# Patient Record
Sex: FEMALE | Race: OTHER | NOT HISPANIC OR LATINO | URBAN - METROPOLITAN AREA
[De-identification: names, ages, dates, MRNs, and addresses within clinical notes are randomized per-mention and may not be internally consistent; named-entity substitution may affect disease eponyms.]

---

## 2022-02-07 ENCOUNTER — NEW PATIENT (OUTPATIENT)
Dept: URBAN - METROPOLITAN AREA CLINIC 4 | Facility: CLINIC | Age: 77
End: 2022-02-07

## 2022-02-07 DIAGNOSIS — H43.812: ICD-10-CM

## 2022-02-07 DIAGNOSIS — H25.89: ICD-10-CM

## 2022-02-07 PROCEDURE — 92004 COMPRE OPH EXAM NEW PT 1/>: CPT

## 2022-02-07 PROCEDURE — 92134 CPTRZ OPH DX IMG PST SGM RTA: CPT

## 2022-02-07 ASSESSMENT — TONOMETRY
OD_IOP_MMHG: 19
OS_IOP_MMHG: 21

## 2022-02-07 ASSESSMENT — VISUAL ACUITY
OD_SC: 20/70
OS_SC: 20/50+1
OU_SC: 20/50
OD_PH: 20/50

## 2022-02-07 NOTE — PATIENT DISCUSSION
Retinal tear and detachment warning symptoms reviewed and patient instructed to call immediately if increasing floaters, flashes, or decreasing peripheral vision. Discussed immediate referral to retina if symptoms increase.

## 2022-02-07 NOTE — PATIENT DISCUSSION
Recommended observation. Will complete RNFL OCT at follow-up, IOPs normal/borderline. + fam hx of glaucoma (uncle). No gtts indicated at this time.

## 2022-03-11 ENCOUNTER — ESTABLISHED PATIENT (OUTPATIENT)
Dept: URBAN - METROPOLITAN AREA CLINIC 4 | Facility: CLINIC | Age: 77
End: 2022-03-11

## 2022-03-11 DIAGNOSIS — H25.89: ICD-10-CM

## 2022-03-11 DIAGNOSIS — H43.812: ICD-10-CM

## 2022-03-11 PROCEDURE — 99213 OFFICE O/P EST LOW 20 MIN: CPT

## 2022-03-11 PROCEDURE — 92015 DETERMINE REFRACTIVE STATE: CPT

## 2022-03-11 ASSESSMENT — KERATOMETRY
OS_K2POWER_DIOPTERS: 44.75
OD_K1POWER_DIOPTERS: 43.50
OD_K2POWER_DIOPTERS: 44.25
OD_AXISANGLE_DEGREES: 004
OS_AXISANGLE2_DEGREES: 95
OS_K1POWER_DIOPTERS: 44
OD_AXISANGLE2_DEGREES: 94
OS_AXISANGLE_DEGREES: 005

## 2022-03-11 ASSESSMENT — VISUAL ACUITY
OS_CC: 20/25-2
OD_CC: 20/50

## 2022-03-11 ASSESSMENT — TONOMETRY: OS_IOP_MMHG: 14

## 2022-03-11 NOTE — PATIENT DISCUSSION
Recommended observation. Monitor with OCT, IOPs normal. + fam hx of glaucoma (uncle). No gtts indicated at this time.

## 2022-06-30 RX ORDER — ONDANSETRON 4 MG/1
TABLET, ORALLY DISINTEGRATING ORAL
COMMUNITY
Start: 2021-11-18

## 2022-06-30 RX ORDER — BUSPIRONE HYDROCHLORIDE 5 MG/1
TABLET ORAL
COMMUNITY